# Patient Record
Sex: MALE | Race: WHITE
[De-identification: names, ages, dates, MRNs, and addresses within clinical notes are randomized per-mention and may not be internally consistent; named-entity substitution may affect disease eponyms.]

---

## 2022-01-28 ENCOUNTER — HOSPITAL ENCOUNTER (INPATIENT)
Dept: HOSPITAL 54 - ER | Age: 77
DRG: 871 | End: 2022-01-28
Attending: INTERNAL MEDICINE | Admitting: INTERNAL MEDICINE
Payer: COMMERCIAL

## 2022-01-28 VITALS — SYSTOLIC BLOOD PRESSURE: 135 MMHG | DIASTOLIC BLOOD PRESSURE: 62 MMHG

## 2022-01-28 VITALS — BODY MASS INDEX: 25.18 KG/M2 | HEIGHT: 69 IN | WEIGHT: 170 LBS

## 2022-01-28 VITALS — SYSTOLIC BLOOD PRESSURE: 132 MMHG | DIASTOLIC BLOOD PRESSURE: 48 MMHG

## 2022-01-28 DIAGNOSIS — Z20.822: ICD-10-CM

## 2022-01-28 DIAGNOSIS — N17.0: ICD-10-CM

## 2022-01-28 DIAGNOSIS — J15.6: ICD-10-CM

## 2022-01-28 DIAGNOSIS — J44.1: ICD-10-CM

## 2022-01-28 DIAGNOSIS — J96.01: ICD-10-CM

## 2022-01-28 DIAGNOSIS — A41.9: Primary | ICD-10-CM

## 2022-01-28 DIAGNOSIS — I48.91: ICD-10-CM

## 2022-01-28 DIAGNOSIS — J44.0: ICD-10-CM

## 2022-01-28 DIAGNOSIS — G93.41: ICD-10-CM

## 2022-01-28 DIAGNOSIS — Z66: ICD-10-CM

## 2022-01-28 LAB
ALBUMIN SERPL BCP-MCNC: 2.1 G/DL (ref 3.4–5)
ALP SERPL-CCNC: 108 U/L (ref 46–116)
ALT SERPL W P-5'-P-CCNC: 13 U/L (ref 12–78)
AST SERPL W P-5'-P-CCNC: 30 U/L (ref 15–37)
BASOPHILS # BLD AUTO: 0 K/UL (ref 0–0.2)
BASOPHILS NFR BLD AUTO: 0.5 % (ref 0–2)
BASOPHILS NFR BLD MANUAL: 0 % (ref 0–2)
BILIRUB DIRECT SERPL-MCNC: 0.3 MG/DL (ref 0–0.2)
BILIRUB SERPL-MCNC: 0.7 MG/DL (ref 0.2–1)
BUN SERPL-MCNC: 23 MG/DL (ref 7–18)
CALCIUM SERPL-MCNC: 8.2 MG/DL (ref 8.5–10.1)
CHLORIDE SERPL-SCNC: 100 MMOL/L (ref 98–107)
CO2 SERPL-SCNC: 17 MMOL/L (ref 21–32)
CREAT SERPL-MCNC: 1.8 MG/DL (ref 0.6–1.3)
EOSINOPHIL NFR BLD AUTO: 0.2 % (ref 0–6)
EOSINOPHIL NFR BLD MANUAL: 0 % (ref 0–4)
GLUCOSE SERPL-MCNC: 292 MG/DL (ref 74–106)
HCT VFR BLD AUTO: 30 % (ref 39–51)
HGB BLD-MCNC: 9.5 G/DL (ref 13.5–17.5)
LYMPHOCYTES NFR BLD AUTO: 4 K/UL (ref 0.8–4.8)
LYMPHOCYTES NFR BLD AUTO: 81.1 % (ref 20–44)
LYMPHOCYTES NFR BLD MANUAL: 71 % (ref 16–48)
MCHC RBC AUTO-ENTMCNC: 31 G/DL (ref 31–36)
MCV RBC AUTO: 109 FL (ref 80–96)
MONOCYTES NFR BLD AUTO: 0.3 K/UL (ref 0.1–1.3)
MONOCYTES NFR BLD AUTO: 5.8 % (ref 2–12)
MONOCYTES NFR BLD MANUAL: 13 % (ref 0–11)
NEUTROPHILS # BLD AUTO: 0.6 K/UL (ref 1.8–8.9)
NEUTROPHILS NFR BLD AUTO: 12.4 % (ref 43–81)
NEUTS BAND NFR BLD MANUAL: 2 % (ref 0–5)
NEUTS SEG NFR BLD MANUAL: 10 % (ref 42–76)
PLATELET # BLD AUTO: 115 K/UL (ref 150–450)
POTASSIUM SERPL-SCNC: 4.1 MMOL/L (ref 3.5–5.1)
PROT SERPL-MCNC: 9.9 G/DL (ref 6.4–8.2)
RBC # BLD AUTO: 2.79 MIL/UL (ref 4.5–6)
SODIUM SERPL-SCNC: 129 MMOL/L (ref 136–145)
WBC NRBC COR # BLD AUTO: 4.9 K/UL (ref 4.3–11)

## 2022-01-28 PROCEDURE — U0003 INFECTIOUS AGENT DETECTION BY NUCLEIC ACID (DNA OR RNA); SEVERE ACUTE RESPIRATORY SYNDROME CORONAVIRUS 2 (SARS-COV-2) (CORONAVIRUS DISEASE [COVID-19]), AMPLIFIED PROBE TECHNIQUE, MAKING USE OF HIGH THROUGHPUT TECHNOLOGIES AS DESCRIBED BY CMS-2020-01-R: HCPCS

## 2022-01-28 PROCEDURE — C9803 HOPD COVID-19 SPEC COLLECT: HCPCS

## 2022-01-28 PROCEDURE — G0378 HOSPITAL OBSERVATION PER HR: HCPCS

## 2022-01-28 NOTE — NUR
RT NOTE



PT INTUBATED FOR RESPIRATORY DISTRESS AND LOW SAT. 7.5 ETT 23CM @ LIP. POSITIVE COLOR CHANGE 
DETECTED ON CO2 DETECTOR AND BILATERAL BREATH SOUNDS HEARD. 


-------------------------------------------------------------------------------

Addendum: 01/28/22 at 1845 by ASHLY SINGLETON RT

-------------------------------------------------------------------------------

Amended: Links added.

## 2022-01-28 NOTE — NUR
ICU RN

S/W JOSE CRUZ DIAZ WHO IS DECISION MAKER FOR PT UPDATED ON PT CONDITION; AND AGREED TO DNR 
STATUS.

OBTAINED ORDER FROM A PRAD[facility] FOR DNR.

## 2022-01-28 NOTE — NUR
ICU RN

PTS NEX OF [facility]IN JOSE CRUZ DIAZ AT BEDSIDE; HE WILL MAKE MORTUARY ARRANGEMENTS. BELONGINGS GIVEN 
TO HIM.

## 2022-01-28 NOTE — NUR
RT NOTE



PT RECEIVED IN ED FOR SOB. PLACED ON BIPAP PER MD ORDER.

-------------------------------------------------------------------------------

Addendum: 01/28/22 at 1845 by ASHLY SINGLETON RT

-------------------------------------------------------------------------------

Amended: Links added.

## 2022-01-28 NOTE — NUR
ICU RN

RCD PT FROM ER W/DX SEPSIS, RESP FAIL. PT IS COLD TO TOUCH UNABLE TO READ TEMP. PUPILS FIXED 
AT 1. NO RESPONSE TO PAINFUL STIMULI. PT INTUBATED UNABLE TO READ SATURATION. PT NOTED WITH 
MOTTLING OF THE SKIN. LEVOPHED AT 0.8 MCG/KG/MIN.

## 2022-01-28 NOTE — NUR
INTUBATION DONE BY . ET SIZE 7.5 23MM AT THE LIP. POSTIVE COLOR 
CHANGE AND BILATERAL EQUAL CHEST RISE.

## 2022-01-28 NOTE — NUR
CALLED [facility] AND OPENED UP A CASE FOR THE PT. WILL CALL BACK WHEN PT IS 
READY TO BE ADMITTED

## 2022-01-29 LAB
BASE EXCESS BLDA CALC-SCNC: -15.6 MMOL/L
INHALED O2 CONCENTRATION: 100 %
INHALED O2 FLOW RATE: 15 L/MIN (ref 0–30)
PCO2 TEMP ADJ BLDA: 33.4 MMHG (ref 35–45)
PH TEMP ADJ BLDA: 7.17 [PH] (ref 7.35–7.45)
PO2 TEMP ADJ BLDA: 77.8 MMHG (ref 75–100)